# Patient Record
Sex: MALE | Race: WHITE | URBAN - METROPOLITAN AREA
[De-identification: names, ages, dates, MRNs, and addresses within clinical notes are randomized per-mention and may not be internally consistent; named-entity substitution may affect disease eponyms.]

---

## 2019-08-02 ENCOUNTER — OFFICE (OUTPATIENT)
Dept: URBAN - METROPOLITAN AREA CLINIC 78 | Facility: CLINIC | Age: 64
End: 2019-08-02

## 2019-08-02 VITALS
DIASTOLIC BLOOD PRESSURE: 84 MMHG | HEART RATE: 70 BPM | TEMPERATURE: 97.8 F | SYSTOLIC BLOOD PRESSURE: 121 MMHG | HEIGHT: 70 IN | WEIGHT: 164 LBS

## 2019-08-02 DIAGNOSIS — F17.210 NICOTINE DEPENDENCE, CIGARETTES, UNCOMPLICATED: ICD-10-CM

## 2019-08-02 DIAGNOSIS — K59.09 OTHER CONSTIPATION: ICD-10-CM

## 2019-08-02 DIAGNOSIS — J44.9 CHRONIC OBSTRUCTIVE PULMONARY DISEASE, UNSPECIFIED: ICD-10-CM

## 2019-08-02 DIAGNOSIS — I21.9 ACUTE MYOCARDIAL INFARCTION, UNSPECIFIED: ICD-10-CM

## 2019-08-02 DIAGNOSIS — R10.13 EPIGASTRIC PAIN: ICD-10-CM

## 2019-08-02 PROCEDURE — 99244 OFF/OP CNSLTJ NEW/EST MOD 40: CPT

## 2019-08-02 RX ORDER — POLYETHYLENE GLYCOL 3350 17 G/17G
POWDER, FOR SOLUTION ORAL
Qty: 1 | Refills: 0 | Status: ACTIVE
Start: 2019-08-02

## 2019-08-02 RX ORDER — PSYLLIUM SEED
10.2 PACKET (EA) ORAL
Qty: 1 | Refills: 0 | Status: ACTIVE
Start: 2019-08-02

## 2019-08-02 NOTE — SERVICEHPINOTES
MARTINEZ GRUBER   is a   63   year old white male with h/o COPD, chronic pain (on Dilaudid pain pump) who is being seen in consultation at the request of   SARAH MALAGON   for abdominal pain and constipation. He reports upper abdominal pains along with heartburn: No h/o gastric ulcers or h pylori. He admits to drinking a lot of Coca-[employer] and other carbonated beverages. He has h/o chronic smoker (recently trying to cut down). He was seen by PCP for these concerns in 06/2019 so he had labs drawn (reviewed records: normal CBC/CMP) and advised GI consult. He has been taking Omeprazole 20 gm qd and Ranitidine 150 mg qd x 1 month so far that has provided some improvement. NSAID use: ASA 81 mg qd (recently stopped x 1 month ago). He states constipation manifesting with BMs qod, BSS type 2 to 4 that started x 1 month ago: No change in diet/medications. No trial of Miralax or Metamucil. H/o MI per patient many years ago and no longer followed by cardiologist No recurrent cardiac issues within the past 10 yrs. He has COPD due to chronic tobacco use (seen by Dr Thompson in 06/2019 with "normal" CT chest scan per patient). No prior EGD. Denies fevers, chest pain, dyspnea, n/v, dysphagia, regurgitation, voice hoarseness, melena, rectal bleeding, diarrhea, weight loss. Prior colonoscopy in 2014 "normal" per patient report.BR

## 2019-08-30 ENCOUNTER — ON CAMPUS - OUTPATIENT (OUTPATIENT)
Dept: URBAN - METROPOLITAN AREA HOSPITAL 14 | Facility: HOSPITAL | Age: 64
End: 2019-08-30
Payer: COMMERCIAL

## 2019-08-30 DIAGNOSIS — K29.60 OTHER GASTRITIS WITHOUT BLEEDING: ICD-10-CM

## 2019-08-30 DIAGNOSIS — R10.13 EPIGASTRIC PAIN: ICD-10-CM

## 2019-08-30 PROCEDURE — 43239 EGD BIOPSY SINGLE/MULTIPLE: CPT

## 2019-09-20 ENCOUNTER — OFFICE (OUTPATIENT)
Dept: URBAN - METROPOLITAN AREA CLINIC 78 | Facility: CLINIC | Age: 64
End: 2019-09-20

## 2019-09-20 VITALS
HEIGHT: 70 IN | DIASTOLIC BLOOD PRESSURE: 71 MMHG | SYSTOLIC BLOOD PRESSURE: 115 MMHG | WEIGHT: 166 LBS | HEART RATE: 71 BPM | TEMPERATURE: 96.7 F

## 2019-09-20 DIAGNOSIS — K59.09 OTHER CONSTIPATION: ICD-10-CM

## 2019-09-20 DIAGNOSIS — R10.13 EPIGASTRIC PAIN: ICD-10-CM

## 2019-09-20 PROCEDURE — 99214 OFFICE O/P EST MOD 30 MIN: CPT

## 2019-09-20 RX ORDER — LINACLOTIDE 145 UG/1
CAPSULE, GELATIN COATED ORAL
Qty: 30 | Refills: 11 | Status: ACTIVE
Start: 2019-09-20

## 2019-09-20 NOTE — SERVICEHPINOTES
MARTINEZ GRUBER   is a   63   year old white male with h/o COPD, chronic pain (on Dilaudid pain pump) who is here for f/u to EGD and ongoing abdominal pain Also complaints of chronic constipation. His 08/2019 EGD found evidence of mild chronic gastritis bx neg h pylori. He states trial of Omeprazole 20 mg qd x 1 month then increased to BID for another x 2 weeks made no improvement to his symptoms, so stopped this PPI. He is currently not taking any medications for this stomach pain. He notes epigastric pain that radiates to his upper back, described as "sharp/dull," occurs several times per week, and it can interfere with sleep (per patient pain wakes him around 2 am). He was initially seen by PCP for these concerns in 06/2019 so he had labs drawn (reviewed records: normal CBC/CMP): No recent abdominal imaging studies done. He is a chronic smoker (recently trying to cut down). NSAID use: ASA 81 mg qd (recently stopped x 2 months ago). He states constipation manifesting with BMs every 2 to 3 days, BSS type 2 to 4. Since he was last seen x 1 month ago and advised trial of Miralax. He has been taking Miralax 1 scoop BID that has improved his stool consistency given more BSS type 4 but still having BMs every 2 days on average. Denies fevers, chest pain, dyspnea, n/v, dysphagia, regurgitation, voice hoarseness, melena, rectal bleeding, diarrhea, weight loss. Prior colonoscopy in 2014 done by another GI group was "normal" per patient report. BR

## 2019-10-18 ENCOUNTER — OFFICE (OUTPATIENT)
Dept: URBAN - METROPOLITAN AREA CLINIC 78 | Facility: CLINIC | Age: 64
End: 2019-10-18

## 2019-10-18 VITALS
SYSTOLIC BLOOD PRESSURE: 142 MMHG | WEIGHT: 166 LBS | HEIGHT: 70 IN | HEART RATE: 74 BPM | DIASTOLIC BLOOD PRESSURE: 82 MMHG | TEMPERATURE: 96.5 F

## 2019-10-18 DIAGNOSIS — F17.200 NICOTINE DEPENDENCE, UNSPECIFIED, UNCOMPLICATED: ICD-10-CM

## 2019-10-18 DIAGNOSIS — F17.210 NICOTINE DEPENDENCE, CIGARETTES, UNCOMPLICATED: ICD-10-CM

## 2019-10-18 DIAGNOSIS — J44.9 CHRONIC OBSTRUCTIVE PULMONARY DISEASE, UNSPECIFIED: ICD-10-CM

## 2019-10-18 DIAGNOSIS — R10.30 LOWER ABDOMINAL PAIN, UNSPECIFIED: ICD-10-CM

## 2019-10-18 DIAGNOSIS — I21.9 ACUTE MYOCARDIAL INFARCTION, UNSPECIFIED: ICD-10-CM

## 2019-10-18 DIAGNOSIS — K59.09 OTHER CONSTIPATION: ICD-10-CM

## 2019-10-18 DIAGNOSIS — R10.13 EPIGASTRIC PAIN: ICD-10-CM

## 2019-10-18 PROCEDURE — 99214 OFFICE O/P EST MOD 30 MIN: CPT

## 2019-10-18 RX ORDER — FAMOTIDINE 40 MG/1
TABLET, FILM COATED ORAL
Qty: 30 | Refills: 11 | Status: ACTIVE
Start: 2019-10-18

## 2019-10-18 RX ORDER — LINACLOTIDE 145 UG/1
CAPSULE, GELATIN COATED ORAL
Qty: 30 | Refills: 11 | Status: ACTIVE
Start: 2019-09-20

## 2019-10-18 NOTE — SERVICEHPINOTES
MARTINEZ GRUBER   is a   64 yo white male who is here for f/u visit since starting Linzess 145 mcg qd for constipation. He states significant improvement given BMs every 2 to 3 days to currently having daily BMs, BSS type 3 to 4 as long as he takes Linzess 145 mcg qd. He also mentions mild, reflux mainly noticed in the mornings. Prior use of PPI did not help, so may try H2RB. Prior colonoscopy in 2009 normal (recall 10 yrs). Denies n/v, regurgitation, early satiety, diarrhea, change in bowel habits, blood in stool, rectal bleeding, weight loss. No other complaints.